# Patient Record
Sex: MALE | Race: WHITE | NOT HISPANIC OR LATINO | URBAN - METROPOLITAN AREA
[De-identification: names, ages, dates, MRNs, and addresses within clinical notes are randomized per-mention and may not be internally consistent; named-entity substitution may affect disease eponyms.]

---
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
## 2024-02-16 NOTE — ED PROVIDER NOTE - PROGRESS NOTE DETAILS
HPI:  89F w/ h/o chronic afib (on Eliquis), GERD, and chronic low back pain presenting s/p fall. Patient known to have chronic low back pain that has progressively worsened over the years. Pain acutely worsened two weeks ago and patient subsequently fell due to inability to support own weight. Evaluated by PCP after initial fall due to worsening pain. Prescribed Percocet 5-325 bid w/ mild relief. However, pt once again fell earlier today after pt unable to support her own weight while trying to ambulate. Now unable to ambulate or bear weight, even w/ assistance. No reported HT, LOC, lightheadedness, confusion, loss of continence, or focal weakness a/w either fall. No reported fevers, chills, CP, palpitations, SOB, abdominal pain, n/v/d, or dysuria (although daughter did note malodorous urine recently).    Interval history  -Patient seen at bedside with alyssa Garcia  -Patient notes leg pain with movement, but only mild pain at rest  2/7: Patient now DNR/DNI, added 10mg oxycontin Q12H for consistent pain  2/12: added PRN oxycodone 5mg Q6H for pain  2/14: stopped oxycodone ER 10mg since patient has been more somnolent and hypotensive occasionally   2/16: Patient was upgraded to telemetry, placed on Cardizem infusion after OR     ADVANCE DIRECTIVES:     [ ] Full Code [x ] DNR  MOLST  [ ]  Living Will  [ ]   DECISION MAKER(s):  [ ] Health Care Proxy(s)  [x ] Surrogate(s)  [ ] Guardian           Name(s): Phone Number(s):  Daughter      BASELINE (I)ADL(s) (prior to admission):    [place]: [ ]Total  [ ] Moderate [ ]Dependent  Palliative Performance Status Version 2:         %    http://npcrc.org/files/news/palliative_performance_scale_[email or web address].pdf    Allergies    No Known Allergies    Intolerances    MEDICATIONS  (STANDING):  acetaminophen     Tablet .. 975 milliGRAM(s) Oral every 8 hours  apixaban 5 milliGRAM(s) Oral two times a day  calcium carbonate   1250 mG (OsCal) 1 Tablet(s) Oral daily  chlorhexidine 2% Cloths 1 Application(s) Topical daily  cholecalciferol 5000 Unit(s) Oral daily  diltiazem    Tablet 30 milliGRAM(s) Oral every 12 hours  fluticasone propionate 50 MICROgram(s)/spray Nasal Spray 1 Spray(s) Both Nostrils two times a day  lactulose Syrup 10 Gram(s) Oral two times a day  lidocaine   4% Patch 1 Patch Transdermal every 24 hours  magnesium sulfate  IVPB 2 Gram(s) IV Intermittent once  metoprolol tartrate 25 milliGRAM(s) Oral three times a day  multivitamin 1 Tablet(s) Oral daily  pantoprazole    Tablet 40 milliGRAM(s) Oral before breakfast  polyethylene glycol 3350 17 Gram(s) Oral two times a day  rOPINIRole 0.25 milliGRAM(s) Oral two times a day  senna 2 Tablet(s) Oral at bedtime  sodium chloride 0.9%. 1000 milliLiter(s) (50 mL/Hr) IV Continuous <Continuous>  tamsulosin 0.8 milliGRAM(s) Oral at bedtime    MEDICATIONS  (PRN):  methocarbamol 750 milliGRAM(s) Oral every 8 hours PRN Muscle Spasm            PRESENT SYMPTOMS: [ ]Unable to obtain due to poor mentation   Source if other than patient:  [ ]Family   [ ]Team     Pain: [x ]yes [ ]no  QOL impact -  moderate  Location - hips  Aggravating factors - touch  Quality - shooting, sharp  Radiation - hips to calves  Timing-  intermittent  Severity (0-10 scale): 5/10  Minimal acceptable level (0-10 scale): 2/10    CPOT:    https://www.[facility].org/getattachment/[email or web address]/Critical-Care-Pain-Observation-Tool-(CPOT)    PAIN AD Score:   http://geriatrictoolkit.[place].[facility]/cog/painad.pdf (press ctrl +  left click to view)    Dyspnea:                           [x ]None[ ]Mild [ ]Moderate [ ]Severe     Respiratory Distress Observation Scale (RDOS):   A score of 0 to 2 signifies little or no respiratory distress, 3 signifies mild distress, scores 4 to 6 indicate moderate distress, and scores greater than 7 signify severe distress  [email or web address]/sites/default/files/[email or web address]-kmhahmwkcry-qlwsivqm-xuqfntgxbqk-[email or web address].pdf    Anxiety:                             [ x]None[ ]Mild [ ]Moderate [ ]Severe   Fatigue:                             [x ]None[ ]Mild [ ]Moderate [ ]Severe   Nausea:                             [ x]None[ ]Mild [ ]Moderate [ ]Severe   Loss of appetite:              [x ]None[ ]Mild [ ]Moderate [ ]Severe   Constipation:                    [x ]None[ ]Mild [ ]Moderate [ ]Severe    Other Symptoms:  [ x]All other review of systems negative     Palliative Performance Status Version 2:         30%    http://[email or web address].org/files/news/palliative_performance_scale_[email or web address].pdf    PHYSICAL EXAM:    Vital Signs Last 24 Hrs  T(C): 36.2 (16 Feb 2024 04:00), Max: 37 (15 Feb 2024 15:33)  T(F): 97.2 (16 Feb 2024 04:00), Max: 98.6 (15 Feb 2024 15:33)  HR: 106 (16 Feb 2024 07:34) (87 - 124)  BP: 98/53 (16 Feb 2024 07:34) (84/53 - 125/62)  BP(mean): 83 (15 Feb 2024 22:00) (75 - 93)  RR: 18 (16 Feb 2024 07:34) (15 - 22)  SpO2: 96% (16 Feb 2024 00:00) (96% - 98%)    Parameters below as of 16 Feb 2024 00:00  Patient On (Oxygen Delivery Method): room air          GENERAL:  [ ] No acute distress [ ]Lethargic  [ ]Unarousable  [ x]Verbal  [ ]Non-Verbal [ ]Cachexia    BEHAVIORAL/PSYCH:  [x ]Alert and Oriented x2  [ ] Anxiety [ ] Delirium [ ] Agitation [ x] Calm   EYES: [ x] No scleral icterus [ ] Scleral icterus [ ] Closed  ENMT:  [ ]Dry mouth  [x ]No external oral lesions [ ] No external ear or nose lesions  CARDIOVASCULAR:  [ ]Regular [ ]Irregular [x ]Tachy [ ]Not Tachy  [ ]Sung [ x] Edema [ ] No edema  PULMONARY:  [ ]Tachypnea  [ ]Audible excessive secretions [ x] No labored breathing [ ] labored breathing  GASTROINTESTINAL: [ ]Soft  [ ]Distended  [x ]Not distended [ ]Non tender [ ]Tender  MUSCULOSKELETAL: [ ]No clubbing [ ] clubbing  [x ] No cyanosis [ ] cyanosis  NEUROLOGIC: [ ]No focal deficits  [x ]Follows commands  [ ]Does not follow commands  [ x]Cognitive impairment  [ ]Dysphagia  [ ]Dysarthria  [ ]Paresis   SKIN: [ ] Jaundiced [ x] Non-jaundiced [ ]Rash [ ]No Rash [ ] Warm [ ] Dry  MISC/LINES: [ ] ET tube [ ] Trach [ ]NGT/OGT [ ]PEG [ ]David    LABS: reviewed by me                                     11.9   4.67  )-----------( 71       ( 16 Feb 2024 06:38 )             35.8     02-16    135  |  100  |  34<H>  ----------------------------<  109<H>  4.8   |  18  |  1.2    Ca    8.6      16 Feb 2024 06:38  Mg     2.0     02-14        Urinalysis Basic - ( 16 Feb 2024 06:38 )    Color: x / Appearance: x / SG: x / pH: x  Gluc: 109 mg/dL / Ketone: x  / Bili: x / Urobili: x   Blood: x / Protein: x / Nitrite: x   Leuk Esterase: x / RBC: x / WBC x   Sq Epi: x / Non Sq Epi: x / Bacteria: x          RADIOLOGY & ADDITIONAL STUDIES: reviewed by me    < from: NM SPECT Bone Scan, Single Area Single Day (02.01.24 @ 16:43) >  IMPRESSION:    Abnormal uptake in the L1 vertebral body, corresponding to known   compression fracture deformity secondary to vertebral body mass.    No other evidence for metastatic bone disease.    < end of copied text >        EKG: reviewed by me    < from: 12 Lead ECG (01.25.24 @ 11:34) >  Ventricular Rate 94 BPM    Atrial Rate 81 BPM    QRS Duration 90 ms    Q-T Interval 384 ms    QTC Calculation(Bazett) 480 ms    R Axis 166 degrees    T Axis -48 degrees    Diagnosis Line Atrial fibrillation  Right ventricular hypertrophy  Anterior infarct , age undetermined  ST & T wave abnormality, consider inferior ischemia  Abnormal ECG    < end of copied text >        PROTEIN CALORIE MALNUTRITION PRESENT: [ ]mild [ ]moderate [ ]severe [ ]underweight [ ]morbid obesity  https://www.andeal.org/vault/3377/web/files/ONC/Table_Clinical%20Characteristics%20to%20Document%20Malnutrition-White%20JV%20et%20al%202012.pdf    Height (cm): 152.4 (01-30-24 @ 11:24)  Weight (kg): 70.3 (01-30-24 @ 11:24)  BMI (kg/m2): 30.3 (01-30-24 @ 11:24)  [ ]PPSV2 < or = to 30% [ ]significant weight loss  [ ]poor nutritional intake  [ ]anasarca      [ ]Artificial Nutrition      Palliative Care Spiritual/Emotional Screening Tool Question  Severity (0-4):                    OR                    [ x] Unable to determine. Will assess at later time if appropriate.  Score of 2 or greater indicates recommendation of Chaplaincy and/or SW referral  Chaplaincy Referral: [ ] Yes [ ] Refused [ ] Following     Caregiver [place]:  [ ] Yes [ ] No    OR    [x ] Unable to determine. Will assess at later time if appropriate.  Social Work Referral [ ]  Patient and Family Centered Care Referral [ ]    Anticipatory Grief Present: [ ] Yes [ ] No    OR     [ x] Unable to determine. Will assess at later time if appropriate.  Social Work Referral [ ]  Patient and Family Centered Care Referral [ ]    Patient discussed with primary medical team MD  Palliative care education provided to patient and/or family   Huang: pt never required any pain meds.  no sign of torsion.  sono shows hydrocele.  dx testicular pain of right.  motrin for pain and if re-occur to return to ed.  MUST follow up with Urologist. left ambulatory.  return precautions given.